# Patient Record
(demographics unavailable — no encounter records)

---

## 2025-03-24 NOTE — PHYSICAL EXAM
[General Appearance - Alert] : alert [General Appearance - In No Acute Distress] : in no acute distress [General Appearance - Well Nourished] : well nourished [General Appearance - Well Developed] : well developed [General Appearance - Well-Appearing] : well appearing [Appearance Of Head] : the head was normocephalic [Facies] : there were no dysmorphic facial features [Sclera] : the conjunctiva were normal [Outer Ear] : the ears and nose were normal in appearance [Examination Of The Oral Cavity] : mucous membranes were moist and pink [Auscultation Breath Sounds / Voice Sounds] : breath sounds clear to auscultation bilaterally [Respiration, Rhythm And Depth] : normal respiratory rhythm and effort [Normal Chest Appearance] : the chest was normal in appearance [Apical Impulse] : quiet precordium with normal apical impulse [Heart Rate And Rhythm] : normal heart rate and rhythm [Heart Sounds] : normal S1 and S2 [Heart Sounds Gallop] : no gallops [Heart Sounds Pericardial Friction Rub] : no pericardial rub [Edema] : no edema [Arterial Pulses] : normal upper and lower extremity pulses with no pulse delay [Heart Sounds Click] : no clicks [Capillary Refill Test] : normal capillary refill [Bowel Sounds] : normal bowel sounds [Abdomen Soft] : soft [Nondistended] : nondistended [Abdomen Tenderness] : non-tender [Nail Clubbing] : no clubbing  or cyanosis of the fingers [Motor Tone] : normal muscle strength and tone [] : no rash [Demonstrated Behavior - Infant Nonreactive To Parents] : interactive [Mood] : mood and affect were appropriate for age [Demonstrated Behavior] : normal behavior [FreeTextEntry7] : 1-2/6 soft musical ELY at the [facility], loudest supine,

## 2025-03-24 NOTE — DISCUSSION/SUMMARY
[FreeTextEntry1] : Mayela is a 4 year old girl with a history of an atrial communication diagnosed at 3 months of age, here for follow up. The right heart has never been dilated and PA pressures have always been noormal. Today she is asympmatic from a cardiovascular perspective and has a reassuring cardiac history, eam, EKG and echocaridoram. S[ecofically, echocardiogram sows spontaneous resoluion of the atrial communication with normal ventricular size and fucntion. She has an innocent murmur on exam.   Innocent murmurs are not related to cardiac pathology, and may get louder during times of illness or fever. They may resolve, or they may persist throughout life, but are of no clinical consequence.  From a cardiac standpoint there are no physical limitations whatsoever, no contraindications to any medications or vaccinations she should require, no cardiac contraindications to anesthesia, dental, or surgical procedures (with no specific anesthesia considerations), and no requirement for SBE prophylaxis prior to procedures. The family verbalized understanding, and all questions were answered.  No further cardiology follow-up is required.   [Needs SBE Prophylaxis] : [unfilled] does not need bacterial endocarditis prophylaxis [May participate in all age-appropriate activities] : [unfilled] May participate in all age-appropriate activities.

## 2025-03-24 NOTE — REASON FOR VISIT
[Initial Consultation] : an initial consultation for [Foster Parents/Guardian] : /guardian [FreeTextEntry3] : Seen in October 2021 by Zaid. Here for f/u ASD and dental clearance

## 2025-03-24 NOTE — PHYSICAL EXAM
[General Appearance - Alert] : alert [General Appearance - In No Acute Distress] : in no acute distress [General Appearance - Well Nourished] : well nourished [General Appearance - Well Developed] : well developed [General Appearance - Well-Appearing] : well appearing [Appearance Of Head] : the head was normocephalic [Facies] : there were no dysmorphic facial features [Sclera] : the conjunctiva were normal [Outer Ear] : the ears and nose were normal in appearance [Examination Of The Oral Cavity] : mucous membranes were moist and pink [Auscultation Breath Sounds / Voice Sounds] : breath sounds clear to auscultation bilaterally [Normal Chest Appearance] : the chest was normal in appearance [Respiration, Rhythm And Depth] : normal respiratory rhythm and effort [Heart Rate And Rhythm] : normal heart rate and rhythm [Apical Impulse] : quiet precordium with normal apical impulse [Heart Sounds] : normal S1 and S2 [Heart Sounds Gallop] : no gallops [Heart Sounds Pericardial Friction Rub] : no pericardial rub [Edema] : no edema [Arterial Pulses] : normal upper and lower extremity pulses with no pulse delay [Heart Sounds Click] : no clicks [Capillary Refill Test] : normal capillary refill [Bowel Sounds] : normal bowel sounds [Abdomen Soft] : soft [Nondistended] : nondistended [Abdomen Tenderness] : non-tender [Nail Clubbing] : no clubbing  or cyanosis of the fingers [Motor Tone] : normal muscle strength and tone [] : no rash [Demonstrated Behavior - Infant Nonreactive To Parents] : interactive [Mood] : mood and affect were appropriate for age [Demonstrated Behavior] : normal behavior [FreeTextEntry7] : 1-2/6 soft musical ELY at the [facility], loudest supine,

## 2025-03-24 NOTE — CONSULT LETTER
[Today's Date] : [unfilled] [Name] : Name: [unfilled] [] : : ~~ [Today's Date:] : [unfilled] [____:] :  [unfilled]: [Consult] : I had the pleasure of evaluating your patient, [unfilled]. My full evaluation follows. [Consult - Single Provider] : Thank you very much for allowing me to participate in the care of this patient. If you have any questions, please do not hesitate to contact me. [Sincerely,] : Sincerely, [FreeTextEntry4] : TRENT Ponce [FreeTextEntry] : [phone] [de-identified] : Suze Mar MD, MPH, FAAP\par  Pediatric Cardiologist\par  Pediatric Intensivist\par   of Pediatrics\par  [employer] [facility] \par  [facility]\par  [facility]\par  [address], \[place] [address]\par  [phone]\par  \par

## 2025-03-24 NOTE — CLINICAL NARRATIVE
[Up to Date] : Up to Date
[Up to Date] : Up to Date
I will STOP taking the medications listed below when I get home from the hospital:  None

## 2025-03-24 NOTE — CARDIOLOGY SUMMARY
[Today's Date] : [unfilled] [FreeTextEntry1] :  Normal sinus rhythm, normal QRS axis, normal intervals (QTc ~420msec), no hypertrophy, no pre-excitation, no ST segment or T wave abnormalities. Normal EKG.  EKG: 10/20/21. Normal sinus rhythm, normal QRS axis, normal intervals (QTc ~440 msec), no hypertrophy, no pre-excitation, no ST segment or T wave abnormalities. Normal EKG. [FreeTextEntry2] : See full report for details. normal intracardiac anatomy with normal biventricular morphology and function. No intracardiac defects noted  Echo: 10/20/21. Summary (see full report for details) patent foramen ovale vs a small secundum atrial septal defect (approx 4-5 mm in diameter) with left to right flow across the interatrial septum. No PPS. Otherwise normal intracardiac anatomy with normal biventricular morphology and function

## 2025-03-24 NOTE — HISTORY OF PRESENT ILLNESS
[FreeTextEntry1] : Mayela is a 4 year old girl with a history of an atrial communication diagnosed at 3 months of age when she presented for murmur evaluation, here for follow up. Since last being seen in October of 2021, She has been doing well. NO cardiovascular symptoms. She is very active and playful-- no activity intolerance. Keeps up with/"outruns" er peers.

## 2025-03-24 NOTE — CONSULT LETTER
[Today's Date] : [unfilled] [Name] : Name: [unfilled] [] : : ~~ [Today's Date:] : [unfilled] [____:] :  [unfilled]: [Consult] : I had the pleasure of evaluating your patient, [unfilled]. My full evaluation follows. [Consult - Single Provider] : Thank you very much for allowing me to participate in the care of this patient. If you have any questions, please do not hesitate to contact me. [Sincerely,] : Sincerely, [FreeTextEntry4] : TRENT Ponce [FreeTextEntry] : [phone] [de-identified] : Suze Mar MD, MPH, FAAP\par  Pediatric Cardiologist\par  Pediatric Intensivist\par   of Pediatrics\par  [employer] [facility] \par  [facility]\par  [facility]\par  [address] \[place] [address]\par  [phone]\par  \par